# Patient Record
Sex: MALE | Race: WHITE
[De-identification: names, ages, dates, MRNs, and addresses within clinical notes are randomized per-mention and may not be internally consistent; named-entity substitution may affect disease eponyms.]

---

## 2017-09-13 ENCOUNTER — HOSPITAL ENCOUNTER (OUTPATIENT)
Dept: HOSPITAL 47 - RADUSWWP | Age: 62
Discharge: HOME | End: 2017-09-13
Payer: COMMERCIAL

## 2017-09-13 DIAGNOSIS — R82.99: Primary | ICD-10-CM

## 2017-09-13 PROCEDURE — 76770 US EXAM ABDO BACK WALL COMP: CPT

## 2017-09-13 NOTE — US
EXAMINATION TYPE: US kidneys/renal and bladder

 

DATE OF EXAM: 9/13/2017

 

COMPARISON: NONE

 

CLINICAL HISTORY: R82.99   Other abnormal findings in urine. Abnormal urine 

 

EXAM MEASUREMENTS:

 

Right Kidney:  12.6 x 6.3 x 5.7 cm

Left Kidney: 13.1 x 6.3 x 5.2 cm

 

 

Right Kidney: cystic area mid = 2.8 x 2.9 x 2.4cm   . No evidence for solid mass.

Left Kidney: lobulated, cystic areas noted with largest = 2.6 x 2.0 x 2.1cm   . No solid masses seen.
 

Bladder: appears wnl

**Bilateral Jets seen: yes

 

 

There is no evidence for hydronephrosis at this point in time.  No nephrolithiasis is seen.  The urin
renee bladder is anechoic.  Bilateral ureteral jets are seen.

 

 

 

IMPRESSION:

Bilateral simple appearing renal cysts. No solid mass is detected.

## 2018-04-17 ENCOUNTER — HOSPITAL ENCOUNTER (OUTPATIENT)
Dept: HOSPITAL 47 - RADCTMAIN | Age: 63
Discharge: HOME | End: 2018-04-17
Payer: COMMERCIAL

## 2018-04-17 DIAGNOSIS — N28.1: Primary | ICD-10-CM

## 2018-04-17 DIAGNOSIS — R59.0: ICD-10-CM

## 2018-04-17 DIAGNOSIS — K63.2: ICD-10-CM

## 2018-04-17 DIAGNOSIS — R93.3: ICD-10-CM

## 2018-04-17 LAB — BUN SERPL-SCNC: 14 MG/DL (ref 9–20)

## 2018-04-17 PROCEDURE — 36415 COLL VENOUS BLD VENIPUNCTURE: CPT

## 2018-04-17 PROCEDURE — 82565 ASSAY OF CREATININE: CPT

## 2018-04-17 PROCEDURE — 84520 ASSAY OF UREA NITROGEN: CPT

## 2018-04-17 PROCEDURE — 74177 CT ABD & PELVIS W/CONTRAST: CPT

## 2018-04-17 NOTE — CT
EXAMINATION TYPE: CT abdomen pelvis w con

 

DATE OF EXAM: 4/17/2018

 

COMPARISON: Renal ultrasound dated 9/13/2017

 

HISTORY: hematuria for 2 weeks. Prior report from CT of 1992 indicates terminal ileal thickening.

 

CT DLP: 1669 mGycm

Automated exposure control for dose reduction was used.

 

TECHNIQUE:  Helical acquisition of images was performed from the lung bases through the pelvis.

 

CONTRAST: 

Performed with Oral Contrast and with IV Contrast, patient injected with 100 mL of Isovue 300.

 

FINDINGS: 

 

LUNG BASES: 3 mm right lower lobe pulmonary nodule is seen on series 4 image 12. Remainder the lung b
ases are clear.

 

LIVER/GB: No significant abnormality is appreciated.

 

PANCREAS: No pancreatic ductal dilatation. Pancreatic parenchyma enhances homogeneously.

 

SPLEEN: No significant abnormality is seen.

 

ADRENALS: Adrenal glands are symmetric without nodularity.

 

KIDNEYS: Right renal cyst measures 2.8 cm cortically-based from the medial mid pole. Smaller probable
 cyst of the upper pole measures 5 mm and is too small to accurately characterize but hypoattenuated.
 Left renal cysts measure 1.7 cm in the upper pole and is also cortically based. No evidence of perin
ephric fat stranding or hydronephrosis. No urinary bladder calculi or ureteral calculi.

 

FREE AIR:  No free air is visualized.

 

REPRODUCTIVE ORGANS: No significant abnormality is seen

 

URINARY BLADDER:  No significant abnormality is seen.

 

ADENOPATHY:  Local adenopathy surrounding the desmoplastic reaction and enterovaginal fistula is desc
ribed below in the bowel section.

 

OSSEOUS STRUCTURES:  No significant abnormality is seen.

 

BOWEL: There is a desmoplastic reaction with tethering of bowel loops in the right mid abdomen from s
eries 3 image 52 caudally to series 3 image 46. Centrally there is oral contrast on series 3 image 49
 from a loop of proximal small bowel demonstrate bowel wall thickening up to 6 mm. Multiple loops of 
tethered small bowel are present in addition to bowel wall thickening of the cecum which is located a
t the hepatic flexure. What appears to be the terminal ileum also demonstrates bowel wall thickening 
and mucosal deposition of fat from chronic inflammatory change. There is also comb sign of the vasa r
ecta from engorgement. The distal ileum is curvilinear without evidence of proximal dilatation to sug
gest obstruction. There is also extensive localization in the position of mesenteric fat surrounding 
chronically inflamed loops of bowel such as on series 3 image 66 with displacement of other loops of 
adjacent bowel. In consequence there is swirling of the mesenteric vasculature. Multiple loops of sma
ll bowel within the mid abdomen demonstrate mild bowel wall thickening without proximal obstruction. 
Numerous surrounding prominent lymph nodes are seen within the mesentery such as on series 3 image 15
 2.

 

The transverse colon, descending colon and sigmoid colon are unremarkable. 

 

IMPRESSION: 

1. 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
 OF ADJACENT LOOPS OF SMALL BOWEL IS SEEN WITHIN THE RIGHT MID ABDOMEN IN ADDITION TO NUMEROUS SEQUEL
A OF CHRONIC INFLAMMATORY CHANGE OF THE TERMINAL ILEUM AND LOCAL ADENOPATHY. ADDITIONALLY THERE IS TH
ICKENING OF THE DISPLACED CECUM AND COULD BE REACTIVE HOWEVER EVALUATION FOR UNDERLYING NEOPLASM IS R
ECOMMENDED WITH COLONOSCOPY IF NOT RECENTLY PERFORMED.

 

2. BILATERAL SIMPLE RENAL CYSTS WITHOUT NEPHROLITHIASIS, HYDRONEPHROSIS, OR URETERAL CALCULUS. NO URO
EPITHELIAL THICKENING IS SEEN.

 

A Yellow level critical message alert has been initiated for Francis Caro MD via the [employer] Critical Results System on 4/17/2018 5:04 PM.  This message alert has been sent to SHUKRI Bermudez via the preferences provided by the clinician for the receipt of Radiology Critical Findings. Michael
[employer] ID [id number].

## 2018-05-10 ENCOUNTER — HOSPITAL ENCOUNTER (OUTPATIENT)
Dept: HOSPITAL 47 - ORWHC2ENDO | Age: 63
Discharge: HOME | End: 2018-05-10
Attending: SURGERY
Payer: COMMERCIAL

## 2018-05-10 VITALS — HEART RATE: 77 BPM | SYSTOLIC BLOOD PRESSURE: 121 MMHG | DIASTOLIC BLOOD PRESSURE: 75 MMHG

## 2018-05-10 VITALS — RESPIRATION RATE: 16 BRPM | TEMPERATURE: 97.9 F

## 2018-05-10 VITALS — BODY MASS INDEX: 30.5 KG/M2

## 2018-05-10 DIAGNOSIS — Z88.8: ICD-10-CM

## 2018-05-10 DIAGNOSIS — K29.50: Primary | ICD-10-CM

## 2018-05-10 DIAGNOSIS — K21.0: ICD-10-CM

## 2018-05-10 DIAGNOSIS — D12.2: ICD-10-CM

## 2018-05-10 DIAGNOSIS — Z79.899: ICD-10-CM

## 2018-05-10 DIAGNOSIS — I10: ICD-10-CM

## 2018-05-10 DIAGNOSIS — K50.90: ICD-10-CM

## 2018-05-10 DIAGNOSIS — K63.5: ICD-10-CM

## 2018-05-10 PROCEDURE — 43239 EGD BIOPSY SINGLE/MULTIPLE: CPT

## 2018-05-10 PROCEDURE — 88305 TISSUE EXAM BY PATHOLOGIST: CPT

## 2018-05-10 PROCEDURE — 45380 COLONOSCOPY AND BIOPSY: CPT

## 2018-05-10 NOTE — P.OP
Date of Procedure: 05/10/18


Preoperative Diagnosis: 


Crohn's disease





Entero-enteral fistula


Postoperative Diagnosis: 


Antral gastritis





Mild esophagitis





Right colon inflammatory polyp pathology pending


Procedure(s) Performed: 


EGD





Colonoscopy


Anesthesia: MAC


Surgeon: Aaron Blanchard


Pathology: other (Colon polyp, antrum, esophagus)


Condition: stable


Disposition: PACU


Description of Procedure: 


The patient's placed on the endoscopy table lateral position.  He received IV 

sedation.  The gastroscope placed oropharynx and passed in the esophagus and 

into the stomach.  The scope was then placed through the pylorus.  The first 

and second portion of the duodenum appeared normal.  Scope summer back the 

antrum and this appeared mildly inflamed.  A biopsies performed.  The scope was 

then retroflexed and remainder of the stomach appeared normal.  There is no 

significant hiatal hernia.  The GE junction was at 40 cm.  The distal esophagus 

appeared mildly inflamed a biopsies performed.  The proximal esophagus appeared 

normal.  Scope was withdrawn for patient.





Next digital rectal exam was performed which revealed no abnormalities.  The 

flexible colonoscope was then placed patient anus passed throughout the entire 

colon.  The ileocecal was not visualized secondary to the right colon not 

expanding with air.  There is thought to be some inflammatory changes.  There 

were some inflammatory pseudopolyps seen these were biopsied in the right 

colon.  The remainder the transverse colon descending colon and sigmoid colon 

appeared normal.  Scope summer back the rectum this appeared normal.  Scope was 

withdrawn for patient.

## 2018-05-10 NOTE — P.GSHP
History of Present Illness


H&P Date: 05/10/18


Chief Complaint: History Crohn's disease, epigastric pain





This is a 63-year-old male who presents today for EGD colonoscopy.  Patient has 

history Crohn's disease.  Patient underwent recent CAT scan shows evidence of 

enteral enteral fistulas.  He's had complaints of epigastric pain.





Past Medical History


Past Medical History: Hypertension


Additional Past Medical History / Comment(s): CHROHN'S DISEASE


History of Any Multi-Drug Resistant Organisms: None Reported


Past Surgical History: Hernia Repair


Additional Past Surgical History / Comment(s): COLONOSCOPY


Past Anesthesia/Blood Transfusion Reactions: No Reported Reaction


Smoking Status: Never smoker





- Past Family History


  ** Mother


Family Medical History: No Reported History





Medications and Allergies


 Home Medications











 Medication  Instructions  Recorded  Confirmed  Type


 


Lisinopril [Zestril] 20 mg PO DAILY 05/07/18 05/10/18 History











 Allergies











Allergy/AdvReac Type Severity Reaction Status Date / Time


 


prochlorperazine AdvReac  SHAKING, Verified 05/07/18 15:00





[From Compazine]   JAWS FEEL  





   TIGHT  














Surgical - Exam


 Vital Signs











Temp Pulse Resp BP Pulse Ox


 


 97.9 F   75   16   155/88   97 


 


 05/10/18 09:13  05/10/18 09:13  05/10/18 09:13  05/10/18 09:13  05/10/18 09:13














- General


well developed, no distress





- Eyes


PERRL





- ENT


normal pinna





- Neck


no masses





- Respiratory


normal expansion





- Cardiovascular


Rhythm: regular





- Abdomen


Abdomen: soft, non tender





Assessment and Plan


Assessment: 





Crohn's disease





Epigastric pain





Enteral enteral fistula





We'll perform EGD colonoscopy.

## 2018-06-27 ENCOUNTER — HOSPITAL ENCOUNTER (OUTPATIENT)
Dept: HOSPITAL 47 - LABPAT | Age: 63
Discharge: HOME | End: 2018-06-27
Payer: COMMERCIAL

## 2018-06-27 DIAGNOSIS — Z01.812: ICD-10-CM

## 2018-06-27 DIAGNOSIS — R31.21: ICD-10-CM

## 2018-06-27 DIAGNOSIS — I10: ICD-10-CM

## 2018-06-27 DIAGNOSIS — E78.5: ICD-10-CM

## 2018-06-27 DIAGNOSIS — Z01.818: Primary | ICD-10-CM

## 2018-06-27 DIAGNOSIS — R35.0: ICD-10-CM

## 2018-06-27 LAB
ANION GAP SERPL CALC-SCNC: 12 MMOL/L
BASOPHILS # BLD AUTO: 0.1 K/UL (ref 0–0.2)
BASOPHILS NFR BLD AUTO: 1 %
BUN SERPL-SCNC: 16 MG/DL (ref 9–20)
CALCIUM SPEC-MCNC: 9.3 MG/DL (ref 8.4–10.2)
CHLORIDE SERPL-SCNC: 102 MMOL/L (ref 98–107)
CO2 SERPL-SCNC: 25 MMOL/L (ref 22–30)
EOSINOPHIL # BLD AUTO: 0.2 K/UL (ref 0–0.7)
EOSINOPHIL NFR BLD AUTO: 2 %
ERYTHROCYTE [DISTWIDTH] IN BLOOD BY AUTOMATED COUNT: 5.13 M/UL (ref 4.3–5.9)
ERYTHROCYTE [DISTWIDTH] IN BLOOD: 12.9 % (ref 11.5–15.5)
GLUCOSE SERPL-MCNC: 95 MG/DL (ref 74–99)
HCT VFR BLD AUTO: 46.3 % (ref 39–53)
HGB BLD-MCNC: 15.1 GM/DL (ref 13–17.5)
LYMPHOCYTES # SPEC AUTO: 0.9 K/UL (ref 1–4.8)
LYMPHOCYTES NFR SPEC AUTO: 10 %
MCH RBC QN AUTO: 29.5 PG (ref 25–35)
MCHC RBC AUTO-ENTMCNC: 32.6 G/DL (ref 31–37)
MCV RBC AUTO: 90.4 FL (ref 80–100)
MONOCYTES # BLD AUTO: 0.6 K/UL (ref 0–1)
MONOCYTES NFR BLD AUTO: 6 %
NEUTROPHILS # BLD AUTO: 7.5 K/UL (ref 1.3–7.7)
NEUTROPHILS NFR BLD AUTO: 81 %
PH UR: 5.5 [PH] (ref 5–8)
PLATELET # BLD AUTO: 262 K/UL (ref 150–450)
POTASSIUM SERPL-SCNC: 5 MMOL/L (ref 3.5–5.1)
SODIUM SERPL-SCNC: 139 MMOL/L (ref 137–145)
SP GR UR: 1.01 (ref 1–1.03)
UROBILINOGEN UR QL STRIP: <2 MG/DL (ref ?–2)
WBC # BLD AUTO: 9.3 K/UL (ref 3.8–10.6)

## 2018-06-27 PROCEDURE — 87086 URINE CULTURE/COLONY COUNT: CPT

## 2018-06-27 PROCEDURE — 81003 URINALYSIS AUTO W/O SCOPE: CPT

## 2018-06-27 PROCEDURE — 85025 COMPLETE CBC W/AUTO DIFF WBC: CPT

## 2018-06-27 PROCEDURE — 80048 BASIC METABOLIC PNL TOTAL CA: CPT

## 2018-06-27 PROCEDURE — 93005 ELECTROCARDIOGRAM TRACING: CPT

## 2018-07-05 ENCOUNTER — HOSPITAL ENCOUNTER (OUTPATIENT)
Dept: HOSPITAL 47 - OR | Age: 63
Discharge: HOME | End: 2018-07-05
Payer: COMMERCIAL

## 2018-07-05 VITALS — BODY MASS INDEX: 29.6 KG/M2

## 2018-07-05 VITALS — DIASTOLIC BLOOD PRESSURE: 76 MMHG | RESPIRATION RATE: 16 BRPM | HEART RATE: 72 BPM | SYSTOLIC BLOOD PRESSURE: 139 MMHG

## 2018-07-05 VITALS — TEMPERATURE: 97.7 F

## 2018-07-05 DIAGNOSIS — J32.9: ICD-10-CM

## 2018-07-05 DIAGNOSIS — K50.90: ICD-10-CM

## 2018-07-05 DIAGNOSIS — I10: ICD-10-CM

## 2018-07-05 DIAGNOSIS — E78.5: ICD-10-CM

## 2018-07-05 DIAGNOSIS — Z79.899: ICD-10-CM

## 2018-07-05 DIAGNOSIS — K43.9: ICD-10-CM

## 2018-07-05 DIAGNOSIS — K63.2: ICD-10-CM

## 2018-07-05 DIAGNOSIS — N28.1: ICD-10-CM

## 2018-07-05 DIAGNOSIS — Z88.8: ICD-10-CM

## 2018-07-05 DIAGNOSIS — R35.1: ICD-10-CM

## 2018-07-05 DIAGNOSIS — R82.90: Primary | ICD-10-CM

## 2018-07-05 DIAGNOSIS — E66.9: ICD-10-CM

## 2018-07-05 DIAGNOSIS — R35.0: ICD-10-CM

## 2018-07-05 PROCEDURE — 74420 UROGRAPHY RTRGR +-KUB: CPT

## 2018-07-05 PROCEDURE — 88305 TISSUE EXAM BY PATHOLOGIST: CPT

## 2018-07-05 PROCEDURE — 52204 CYSTOSCOPY W/BIOPSY(S): CPT

## 2018-07-05 NOTE — P.OP
Date of Procedure: 07/05/18


Preoperative Diagnosis: 


Abnormal Urine Cytology


Postoperative Diagnosis: 


Same


Procedure(s) Performed: 


Cystoscopy, bilateral retrograde pyelograms, selected bladder biopsies


Anesthesia: ROYCEA


Surgeon: Francis Caro


Estimated Blood Loss (ml): 0


IV fluids (ml): 600


Pathology: other (Bladder biopsies)


Condition: stable


Disposition: PACU


Indications for Procedure: 


He is a 63-year-old male recently found to have atypical urine cytology on 

multiple occasions. He denies gross hematuria. He is a lifetime non-smoker. A 

renal ultrasound revealed simple renal cysts but was otherwise unremarkable. 

Cystoscopy showed no abnormalities. Recent urine cytology is suspicious, and 

urine FISH was positive. A recent CT scan confirmed the presence of renal cysts

, but also showed an enteroenteric fistula related to his history of Crohn's 

disease. He will undergo cystoscopy with bladder biopsies and retrograde 

pyelograms, possible ureteroscopy.


Operative Findings: 


Medial deviation of right mid ureter.  No other abnormalities seen.


Description of Procedure: 


The patient was taken to the operating room and placed in the dorsolithotomy 

position, with legs supported in Sabino stirrups.  The external genitalia was 

prepped and draped sterilely.  The 30 lens was used to introduce the 22-Dominican 

Stortz cystoscopic sheath through the urethra and into the bladder under direct 

vision.  The prostatic urethra showed evidence of mild lateral lobe enlargement

, causing only minimal obstruction.  The bladder was examined in its entirety.  

Both ureteral orifices were of normal anatomic location and configuration, and 

clear urine effluxed from both.  No tumors or foreign bodies were seen.  





Using a 10-Dominican cone-tip catheter, bilateral retrograde pyelograms were 

performed in the standard fashion.  The course of each ureter and intrarenal 

collecting system were seen on fluoroscopy.  There were no filling defects or 

stones seen.  There is no evidence of obstruction or hydronephrosis.  The right 

mid ureter was noted to be medially deviated.





Using the cold cup biopsy forceps, mucosal biopsies were obtained from the 

anterior bladder wall, posterior bladder wall, bladder dome, right lateral 

bladder wall, and left lateral bladder wall.  The Bugbee electrode was used to 

fulgurate each biopsy site, attaining excellent hemostasis.  There was no 

evidence of bladder perforation.  The bladder was emptied and the cystoscope 

removed.  The patient tolerated the procedure well was taken to the recovery 

room in stable condition.

## 2018-07-05 NOTE — FL
Fluoroscopy

 

HISTORY: Cystogram, microscopic hematuria, retrograde (

 

29 seconds fluoroscopy time supplied to the referring clinician.  12 intraoperative C-arm images docu
ment the procedure. See dictated report from urology.

## 2018-08-01 ENCOUNTER — HOSPITAL ENCOUNTER (OUTPATIENT)
Dept: HOSPITAL 47 - RADCTMAIN | Age: 63
Discharge: HOME | End: 2018-08-01
Attending: SURGERY
Payer: COMMERCIAL

## 2018-08-01 DIAGNOSIS — K52.9: Primary | ICD-10-CM

## 2018-08-01 PROCEDURE — 74177 CT ABD & PELVIS W/CONTRAST: CPT

## 2018-08-01 NOTE — CT
EXAMINATION TYPE: CT abdomen pelvis w con

 

DATE OF EXAM: 8/1/2018

 

COMPARISON: CT abdomen pelvis April 17, 2018

 

HISTORY: Crohn's disease

 

CT DLP: 1075.9 mGycm, Automated Exposure Control for Dose Reduction was Utilized.

 

CONTRAST: 

CT scan of the abdomen and pelvis is performed with oral and with IV Contrast, patient injected with 
100 mL of Isovue 300. Non-Enterography protocol.

 

FINDINGS:

 

LUNG BASES: There is new left basilar atelectatic change. There is stable 3 mm lateral right basilar 
nodule axial image 10.

 

LIVER/GB:   No significant abnormality is appreciated.

 

PANCREAS:  No significant abnormality is seen.

 

SPLEEN:  No significant abnormality is seen.

 

ADRENALS:  No significant abnormality is seen.

 

KIDNEYS: There is redemonstration a few scattered simple appearing cysts throughout both kidneys. The
re is symmetric cortical medullary uptake and excretion from both kidneys. There is persistent mild w
all thickening involving the inferior posterior bladder wall, slightly more eccentrically prominent o
n the left axial image 79.

 

BOWEL: There is redemonstration of oral contrast seen in slightly prominent stomach. There is no susp
icious dilatation of duodenal sweep. There is however cysts persistent suspicious area near proximal 
third portion inferiorly where there is inferior soft tissue extension to level of punctate densities
 where there is mild to moderate mid abdominal fluid and swirling centered near axial image 51. Image
s is not significantly changed in appearance from prior study. Oral contrast does not reach distal il
eal level making evaluation suboptimal. Visualized contrast-filled small bowel shows no suspicious op
acification. The area of swirling is near terminal ileum which shows mild to moderate wall thickening
 contiguously extending into distal ileum of the right lower quadrant similar to prior study up to ax
ial image 52. Remainder of colon shows no suspicious wall thickening or dilatation.

 

PROSTATE/SEMINAL VESICLES: Prostate gland is upper limits of normal in size bulging on bladder base. 
Scattered pelvic phlebolith are redemonstrated.

 

LYMPH NODES:  No greater than 1cm abdominal or pelvic lymph nodes are appreciated. Some prominent but
 subcentimeter central mesenteric lymph nodes midabdomen just right of midline seen best for referenc
e coronal image 39 are redemonstrated and stable.

 

OSSEOUS STRUCTURES: There is multilevel spurring in the thoracolumbar spine. There is facet arthropat
hy lower lumbar levels. There is disc space narrowing with vacuum disc phenomenon L5-S1 level. There 
is narrowing and sclerosis of the bilateral sacroiliac joints redemonstrated.

 

OTHER:  No significant additional abnormality is seen.

 

IMPRESSION:

1. Stable abnormal area right midabdomen favoring enteroenteric fistula with additional desmoplastic 
reaction and tethering of adjacent loops of small bowel, sinus tracts are likely also present. There 
is chronic inflammatory change of the distal ileum redemonstrated. No bowel obstruction is seen. No n
ew acute inflammatory process identified.

2.